# Patient Record
Sex: MALE | Race: WHITE | NOT HISPANIC OR LATINO
[De-identification: names, ages, dates, MRNs, and addresses within clinical notes are randomized per-mention and may not be internally consistent; named-entity substitution may affect disease eponyms.]

---

## 2017-01-30 ENCOUNTER — TRANSCRIPTION ENCOUNTER (OUTPATIENT)
Age: 47
End: 2017-01-30

## 2017-02-02 ENCOUNTER — TRANSCRIPTION ENCOUNTER (OUTPATIENT)
Age: 47
End: 2017-02-02

## 2017-02-08 ENCOUNTER — TRANSCRIPTION ENCOUNTER (OUTPATIENT)
Age: 47
End: 2017-02-08

## 2023-10-19 ENCOUNTER — INPATIENT (INPATIENT)
Facility: HOSPITAL | Age: 53
LOS: 1 days | Discharge: HOME | DRG: 392 | End: 2023-10-21
Attending: FAMILY MEDICINE | Admitting: HOSPITALIST
Payer: COMMERCIAL

## 2023-10-19 VITALS — HEIGHT: 76 IN | WEIGHT: 214.95 LBS

## 2023-10-19 DIAGNOSIS — K57.92 DIVERTICULITIS OF INTESTINE, PART UNSPECIFIED, WITHOUT PERFORATION OR ABSCESS WITHOUT BLEEDING: ICD-10-CM

## 2023-10-19 LAB
ALBUMIN SERPL ELPH-MCNC: 4 G/DL — SIGNIFICANT CHANGE UP (ref 3.3–5)
ALBUMIN SERPL ELPH-MCNC: 4 G/DL — SIGNIFICANT CHANGE UP (ref 3.3–5)
ALP SERPL-CCNC: 65 U/L — SIGNIFICANT CHANGE UP (ref 40–120)
ALP SERPL-CCNC: 65 U/L — SIGNIFICANT CHANGE UP (ref 40–120)
ALT FLD-CCNC: 28 U/L — SIGNIFICANT CHANGE UP (ref 12–78)
ALT FLD-CCNC: 28 U/L — SIGNIFICANT CHANGE UP (ref 12–78)
ANION GAP SERPL CALC-SCNC: 6 MMOL/L — SIGNIFICANT CHANGE UP (ref 5–17)
ANION GAP SERPL CALC-SCNC: 6 MMOL/L — SIGNIFICANT CHANGE UP (ref 5–17)
APPEARANCE UR: CLEAR — SIGNIFICANT CHANGE UP
APPEARANCE UR: CLEAR — SIGNIFICANT CHANGE UP
AST SERPL-CCNC: 18 U/L — SIGNIFICANT CHANGE UP (ref 15–37)
AST SERPL-CCNC: 18 U/L — SIGNIFICANT CHANGE UP (ref 15–37)
BACTERIA # UR AUTO: NEGATIVE /HPF — SIGNIFICANT CHANGE UP
BACTERIA # UR AUTO: NEGATIVE /HPF — SIGNIFICANT CHANGE UP
BASOPHILS # BLD AUTO: 0.08 K/UL — SIGNIFICANT CHANGE UP (ref 0–0.2)
BASOPHILS # BLD AUTO: 0.08 K/UL — SIGNIFICANT CHANGE UP (ref 0–0.2)
BASOPHILS NFR BLD AUTO: 0.6 % — SIGNIFICANT CHANGE UP (ref 0–2)
BASOPHILS NFR BLD AUTO: 0.6 % — SIGNIFICANT CHANGE UP (ref 0–2)
BILIRUB SERPL-MCNC: 0.6 MG/DL — SIGNIFICANT CHANGE UP (ref 0.2–1.2)
BILIRUB SERPL-MCNC: 0.6 MG/DL — SIGNIFICANT CHANGE UP (ref 0.2–1.2)
BILIRUB UR-MCNC: NEGATIVE — SIGNIFICANT CHANGE UP
BILIRUB UR-MCNC: NEGATIVE — SIGNIFICANT CHANGE UP
BUN SERPL-MCNC: 9 MG/DL — SIGNIFICANT CHANGE UP (ref 7–23)
BUN SERPL-MCNC: 9 MG/DL — SIGNIFICANT CHANGE UP (ref 7–23)
CALCIUM SERPL-MCNC: 9.7 MG/DL — SIGNIFICANT CHANGE UP (ref 8.5–10.1)
CALCIUM SERPL-MCNC: 9.7 MG/DL — SIGNIFICANT CHANGE UP (ref 8.5–10.1)
CHLORIDE SERPL-SCNC: 105 MMOL/L — SIGNIFICANT CHANGE UP (ref 96–108)
CHLORIDE SERPL-SCNC: 105 MMOL/L — SIGNIFICANT CHANGE UP (ref 96–108)
CO2 SERPL-SCNC: 26 MMOL/L — SIGNIFICANT CHANGE UP (ref 22–31)
CO2 SERPL-SCNC: 26 MMOL/L — SIGNIFICANT CHANGE UP (ref 22–31)
COLOR SPEC: SIGNIFICANT CHANGE UP
COLOR SPEC: SIGNIFICANT CHANGE UP
CREAT SERPL-MCNC: 0.91 MG/DL — SIGNIFICANT CHANGE UP (ref 0.5–1.3)
CREAT SERPL-MCNC: 0.91 MG/DL — SIGNIFICANT CHANGE UP (ref 0.5–1.3)
DIFF PNL FLD: ABNORMAL
DIFF PNL FLD: ABNORMAL
EGFR: 100 ML/MIN/1.73M2 — SIGNIFICANT CHANGE UP
EGFR: 100 ML/MIN/1.73M2 — SIGNIFICANT CHANGE UP
EOSINOPHIL # BLD AUTO: 0.25 K/UL — SIGNIFICANT CHANGE UP (ref 0–0.5)
EOSINOPHIL # BLD AUTO: 0.25 K/UL — SIGNIFICANT CHANGE UP (ref 0–0.5)
EOSINOPHIL NFR BLD AUTO: 1.9 % — SIGNIFICANT CHANGE UP (ref 0–6)
EOSINOPHIL NFR BLD AUTO: 1.9 % — SIGNIFICANT CHANGE UP (ref 0–6)
GLUCOSE SERPL-MCNC: 96 MG/DL — SIGNIFICANT CHANGE UP (ref 70–99)
GLUCOSE SERPL-MCNC: 96 MG/DL — SIGNIFICANT CHANGE UP (ref 70–99)
GLUCOSE UR QL: NEGATIVE MG/DL — SIGNIFICANT CHANGE UP
GLUCOSE UR QL: NEGATIVE MG/DL — SIGNIFICANT CHANGE UP
HCT VFR BLD CALC: 46 % — SIGNIFICANT CHANGE UP (ref 39–50)
HCT VFR BLD CALC: 46 % — SIGNIFICANT CHANGE UP (ref 39–50)
HGB BLD-MCNC: 15.3 G/DL — SIGNIFICANT CHANGE UP (ref 13–17)
HGB BLD-MCNC: 15.3 G/DL — SIGNIFICANT CHANGE UP (ref 13–17)
IMM GRANULOCYTES NFR BLD AUTO: 0.5 % — SIGNIFICANT CHANGE UP (ref 0–0.9)
IMM GRANULOCYTES NFR BLD AUTO: 0.5 % — SIGNIFICANT CHANGE UP (ref 0–0.9)
KETONES UR-MCNC: 40 MG/DL
KETONES UR-MCNC: 40 MG/DL
LEUKOCYTE ESTERASE UR-ACNC: ABNORMAL
LEUKOCYTE ESTERASE UR-ACNC: ABNORMAL
LIDOCAIN IGE QN: 21 U/L — SIGNIFICANT CHANGE UP (ref 13–75)
LIDOCAIN IGE QN: 21 U/L — SIGNIFICANT CHANGE UP (ref 13–75)
LYMPHOCYTES # BLD AUTO: 19.2 % — SIGNIFICANT CHANGE UP (ref 13–44)
LYMPHOCYTES # BLD AUTO: 19.2 % — SIGNIFICANT CHANGE UP (ref 13–44)
LYMPHOCYTES # BLD AUTO: 2.54 K/UL — SIGNIFICANT CHANGE UP (ref 1–3.3)
LYMPHOCYTES # BLD AUTO: 2.54 K/UL — SIGNIFICANT CHANGE UP (ref 1–3.3)
MCHC RBC-ENTMCNC: 30.1 PG — SIGNIFICANT CHANGE UP (ref 27–34)
MCHC RBC-ENTMCNC: 30.1 PG — SIGNIFICANT CHANGE UP (ref 27–34)
MCHC RBC-ENTMCNC: 33.3 GM/DL — SIGNIFICANT CHANGE UP (ref 32–36)
MCHC RBC-ENTMCNC: 33.3 GM/DL — SIGNIFICANT CHANGE UP (ref 32–36)
MCV RBC AUTO: 90.4 FL — SIGNIFICANT CHANGE UP (ref 80–100)
MCV RBC AUTO: 90.4 FL — SIGNIFICANT CHANGE UP (ref 80–100)
MONOCYTES # BLD AUTO: 1 K/UL — HIGH (ref 0–0.9)
MONOCYTES # BLD AUTO: 1 K/UL — HIGH (ref 0–0.9)
MONOCYTES NFR BLD AUTO: 7.6 % — SIGNIFICANT CHANGE UP (ref 2–14)
MONOCYTES NFR BLD AUTO: 7.6 % — SIGNIFICANT CHANGE UP (ref 2–14)
NEUTROPHILS # BLD AUTO: 9.27 K/UL — HIGH (ref 1.8–7.4)
NEUTROPHILS # BLD AUTO: 9.27 K/UL — HIGH (ref 1.8–7.4)
NEUTROPHILS NFR BLD AUTO: 70.2 % — SIGNIFICANT CHANGE UP (ref 43–77)
NEUTROPHILS NFR BLD AUTO: 70.2 % — SIGNIFICANT CHANGE UP (ref 43–77)
NITRITE UR-MCNC: NEGATIVE — SIGNIFICANT CHANGE UP
NITRITE UR-MCNC: NEGATIVE — SIGNIFICANT CHANGE UP
PH UR: 5 — SIGNIFICANT CHANGE UP (ref 5–8)
PH UR: 5 — SIGNIFICANT CHANGE UP (ref 5–8)
PLATELET # BLD AUTO: 304 K/UL — SIGNIFICANT CHANGE UP (ref 150–400)
PLATELET # BLD AUTO: 304 K/UL — SIGNIFICANT CHANGE UP (ref 150–400)
POTASSIUM SERPL-MCNC: 4.3 MMOL/L — SIGNIFICANT CHANGE UP (ref 3.5–5.3)
POTASSIUM SERPL-MCNC: 4.3 MMOL/L — SIGNIFICANT CHANGE UP (ref 3.5–5.3)
POTASSIUM SERPL-SCNC: 4.3 MMOL/L — SIGNIFICANT CHANGE UP (ref 3.5–5.3)
POTASSIUM SERPL-SCNC: 4.3 MMOL/L — SIGNIFICANT CHANGE UP (ref 3.5–5.3)
PROT SERPL-MCNC: 8.6 GM/DL — HIGH (ref 6–8.3)
PROT SERPL-MCNC: 8.6 GM/DL — HIGH (ref 6–8.3)
PROT UR-MCNC: 30 MG/DL
PROT UR-MCNC: 30 MG/DL
RBC # BLD: 5.09 M/UL — SIGNIFICANT CHANGE UP (ref 4.2–5.8)
RBC # BLD: 5.09 M/UL — SIGNIFICANT CHANGE UP (ref 4.2–5.8)
RBC # FLD: 12.7 % — SIGNIFICANT CHANGE UP (ref 10.3–14.5)
RBC # FLD: 12.7 % — SIGNIFICANT CHANGE UP (ref 10.3–14.5)
RBC CASTS # UR COMP ASSIST: 14 /HPF — HIGH (ref 0–4)
RBC CASTS # UR COMP ASSIST: 14 /HPF — HIGH (ref 0–4)
SODIUM SERPL-SCNC: 137 MMOL/L — SIGNIFICANT CHANGE UP (ref 135–145)
SODIUM SERPL-SCNC: 137 MMOL/L — SIGNIFICANT CHANGE UP (ref 135–145)
SP GR SPEC: 1.03 — SIGNIFICANT CHANGE UP (ref 1–1.03)
SP GR SPEC: 1.03 — SIGNIFICANT CHANGE UP (ref 1–1.03)
SQUAMOUS # UR AUTO: 1 /HPF — SIGNIFICANT CHANGE UP (ref 0–5)
SQUAMOUS # UR AUTO: 1 /HPF — SIGNIFICANT CHANGE UP (ref 0–5)
UROBILINOGEN FLD QL: 1 MG/DL — SIGNIFICANT CHANGE UP (ref 0.2–1)
UROBILINOGEN FLD QL: 1 MG/DL — SIGNIFICANT CHANGE UP (ref 0.2–1)
WBC # BLD: 13.2 K/UL — HIGH (ref 3.8–10.5)
WBC # BLD: 13.2 K/UL — HIGH (ref 3.8–10.5)
WBC # FLD AUTO: 13.2 K/UL — HIGH (ref 3.8–10.5)
WBC # FLD AUTO: 13.2 K/UL — HIGH (ref 3.8–10.5)
WBC UR QL: 1 /HPF — SIGNIFICANT CHANGE UP (ref 0–5)
WBC UR QL: 1 /HPF — SIGNIFICANT CHANGE UP (ref 0–5)

## 2023-10-19 PROCEDURE — 99497 ADVNCD CARE PLAN 30 MIN: CPT | Mod: 25

## 2023-10-19 PROCEDURE — 87040 BLOOD CULTURE FOR BACTERIA: CPT

## 2023-10-19 PROCEDURE — 36415 COLL VENOUS BLD VENIPUNCTURE: CPT

## 2023-10-19 PROCEDURE — 85025 COMPLETE CBC W/AUTO DIFF WBC: CPT

## 2023-10-19 PROCEDURE — 99285 EMERGENCY DEPT VISIT HI MDM: CPT

## 2023-10-19 PROCEDURE — 80048 BASIC METABOLIC PNL TOTAL CA: CPT

## 2023-10-19 PROCEDURE — 99223 1ST HOSP IP/OBS HIGH 75: CPT

## 2023-10-19 PROCEDURE — 74177 CT ABD & PELVIS W/CONTRAST: CPT | Mod: 26,MA

## 2023-10-19 PROCEDURE — 80053 COMPREHEN METABOLIC PANEL: CPT

## 2023-10-19 RX ORDER — ACETAMINOPHEN 500 MG
650 TABLET ORAL EVERY 6 HOURS
Refills: 0 | Status: DISCONTINUED | OUTPATIENT
Start: 2023-10-19 | End: 2023-10-21

## 2023-10-19 RX ORDER — ONDANSETRON 8 MG/1
4 TABLET, FILM COATED ORAL ONCE
Refills: 0 | Status: COMPLETED | OUTPATIENT
Start: 2023-10-19 | End: 2023-10-19

## 2023-10-19 RX ORDER — INFLUENZA VIRUS VACCINE 15; 15; 15; 15 UG/.5ML; UG/.5ML; UG/.5ML; UG/.5ML
0.5 SUSPENSION INTRAMUSCULAR ONCE
Refills: 0 | Status: DISCONTINUED | OUTPATIENT
Start: 2023-10-19 | End: 2023-10-21

## 2023-10-19 RX ORDER — SODIUM CHLORIDE 9 MG/ML
1000 INJECTION INTRAMUSCULAR; INTRAVENOUS; SUBCUTANEOUS ONCE
Refills: 0 | Status: COMPLETED | OUTPATIENT
Start: 2023-10-19 | End: 2023-10-19

## 2023-10-19 RX ORDER — LANOLIN ALCOHOL/MO/W.PET/CERES
5 CREAM (GRAM) TOPICAL AT BEDTIME
Refills: 0 | Status: DISCONTINUED | OUTPATIENT
Start: 2023-10-19 | End: 2023-10-21

## 2023-10-19 RX ORDER — PIPERACILLIN AND TAZOBACTAM 4; .5 G/20ML; G/20ML
3.38 INJECTION, POWDER, LYOPHILIZED, FOR SOLUTION INTRAVENOUS EVERY 8 HOURS
Refills: 0 | Status: DISCONTINUED | OUTPATIENT
Start: 2023-10-19 | End: 2023-10-21

## 2023-10-19 RX ORDER — ONDANSETRON 8 MG/1
4 TABLET, FILM COATED ORAL EVERY 4 HOURS
Refills: 0 | Status: DISCONTINUED | OUTPATIENT
Start: 2023-10-19 | End: 2023-10-21

## 2023-10-19 RX ORDER — SODIUM CHLORIDE 9 MG/ML
1000 INJECTION INTRAMUSCULAR; INTRAVENOUS; SUBCUTANEOUS
Refills: 0 | Status: DISCONTINUED | OUTPATIENT
Start: 2023-10-19 | End: 2023-10-20

## 2023-10-19 RX ORDER — PIPERACILLIN AND TAZOBACTAM 4; .5 G/20ML; G/20ML
3.38 INJECTION, POWDER, LYOPHILIZED, FOR SOLUTION INTRAVENOUS ONCE
Refills: 0 | Status: COMPLETED | OUTPATIENT
Start: 2023-10-19 | End: 2023-10-19

## 2023-10-19 RX ADMIN — PIPERACILLIN AND TAZOBACTAM 200 GRAM(S): 4; .5 INJECTION, POWDER, LYOPHILIZED, FOR SOLUTION INTRAVENOUS at 19:30

## 2023-10-19 RX ADMIN — Medication 5 MILLIGRAM(S): at 22:16

## 2023-10-19 RX ADMIN — SODIUM CHLORIDE 1000 MILLILITER(S): 9 INJECTION INTRAMUSCULAR; INTRAVENOUS; SUBCUTANEOUS at 16:38

## 2023-10-19 RX ADMIN — SODIUM CHLORIDE 100 MILLILITER(S): 9 INJECTION INTRAMUSCULAR; INTRAVENOUS; SUBCUTANEOUS at 22:16

## 2023-10-19 RX ADMIN — ONDANSETRON 4 MILLIGRAM(S): 8 TABLET, FILM COATED ORAL at 16:37

## 2023-10-19 RX ADMIN — PIPERACILLIN AND TAZOBACTAM 25 GRAM(S): 4; .5 INJECTION, POWDER, LYOPHILIZED, FOR SOLUTION INTRAVENOUS at 22:31

## 2023-10-19 NOTE — ED STATDOCS - ATTENDING APP SHARED VISIT CONTRIBUTION OF CARE
I, Joanie Trejo DO, personally saw the patient with ACP.  I have personally performed a face to face diagnostic evaluation on this patient.  I have reviewed the ACP note and agree with the history, exam, and plan of care, except as noted.   The initial assessment was performed by myself and then the patient was handed off to the ACP. The patient was followed and re-evaluated by the ACP. All labs, imaging and procedures were evaluated and performed by the ACP and I was available for consultation if any questions in the patients care came up.

## 2023-10-19 NOTE — ED STATDOCS - CLINICAL SUMMARY MEDICAL DECISION MAKING FREE TEXT BOX
Will r/o any worsening diverticulitis vs colitis vs UTI. Plan to do labs, imaging, pain control, and reassess.

## 2023-10-19 NOTE — ED ADULT TRIAGE NOTE - CHIEF COMPLAINT QUOTE
patient presenting to ER with c/o abdominal pain, currently on cipro/flagyl for diverticulitis flare. now reports abdominal pain is even worse than before taking the medicine. pt educated on possible s/e of abx. +N/V/D.

## 2023-10-19 NOTE — ED STATDOCS - OBJECTIVE STATEMENT
54 yo male w/ no pertinent PMHx presents to the ED c/o abdominal pain, for diverticulitis flare. Pt with history of diverticulitis. Pt had a ct scan done last Thursday. States the pain is getting worse. Pt has been taking Ciproflaxin/Flagyl for almost 1 week. +nausea +vomiting, +decrease in PO intake, +fever. Pt c/o LLQ abdominal pain. +diarrhea. States the abdominal pain is intermittent. 53 year old male presenting with worsening abdominal pain.  Pt. currently on Cipro/Flagyl for diverticulitis x 7 days.  CT performed one week ago.  Pain worsening asp er patient. reported he has N/V and subjective fever.  Diarrhea also reported. Denies chest pain, dizziness, SOB, diarrhea, weakness or numbness of ext.

## 2023-10-19 NOTE — ED ADULT NURSE NOTE - NSFALLUNIVINTERV_ED_ALL_ED
Bed/Stretcher in lowest position, wheels locked, appropriate side rails in place/Call bell, personal items and telephone in reach/Instruct patient to call for assistance before getting out of bed/chair/stretcher/Non-slip footwear applied when patient is off stretcher/[place] to call system/Physically safe environment - no spills, clutter or unnecessary equipment/Purposeful proactive rounding/Room/bathroom lighting operational, light cord in reach

## 2023-10-19 NOTE — ED STATDOCS - PROGRESS NOTE DETAILS
Pt. is a 53 year old male presenting with abdominal pain.  Pt. currently on Cipro/Flagyl for diveritculitis x 7 days.  CT performed one week ago.  Pain worsening asp er patient.  N/V and subjective fever.  DIarrhea also repoprted.  Maribeth Nair PA-C Pt. is a 53 year old male presenting with abdominal pain.  Pt. currently on Cipro/Flagyl for diverticulitis x 7 days.  CT performed one week ago.  Pain worsening asp er patient.  N/V and subjective fever.  Diarrhea also reported.  Maribeth Nair PA-C I attempted to have CT results from Dr. Felix's office faxed.  Called x 2 wihtoutr receiving.  CT demonstrating uncomplicated sigmoid diverticulitis without fluid collection.  NEg. perforation.

## 2023-10-19 NOTE — H&P ADULT - TIME BILLING
A minimum of 75 min was spent completing this admission not including time spent discussing advanced care planning or discussing goals of care with a minimum of 36 min spent face to face with patient while in ED unit on admission, counseling patient on current acute on chronic conditions and discussing plan and coordination of care including diagnostic imaging such as ......CT scan, diagnostic testing such and performance of giuiac at bedside & straight cath for sterile urine, ......diagnostic laboratory tests which were ordered and reasoning behind each test, discussion of consultants ordered to evaluate patient in am and reasoning behind each specific need such as .......GI for intractable diarrhea, ID for concern for recurrent C. diff, and nutrition due to acute on chronic malnutrition and cachexia, .....and also predicted possible discharge planning discussed ie [facility] vs home with home services. A minimum of 75 min was spent completing this admission not including time spent discussing advanced care planning or discussing goals of care with a minimum of 36 min spent face to face with patient while in unit on admission, counseling patient on current acute on chronic conditions and discussing plan and coordination of care including diagnostic imaging such as CT scan, discussion of consultants ordered to evaluate patient in am and reasoning behind each specific need such as GI for intractable diarrhea in setting of diverticulitis, ID for concern for recurrent diverticulitis and failure of outpt abx.

## 2023-10-19 NOTE — H&P ADULT - HISTORY OF PRESENT ILLNESS
Pt is a 52 yo male with a pmh/o diverticulitis, uncomplicated and treated as outpatient about 20 yrs ago, benign colon polyps diagnosed 3 yrs ago, who presents to ED due to intractable generalized, non radiating, cramplike abdominal pain which fluctuated in intensity and is aggrevated by oral intake and associated with diarrhea. Pt states he was diagnosed with diverticulitis as outpatient and placed on cipro/flagyl. States last week he was having 7-8 BM per day, non bloody but watery diarrhea and since starting abx is now down to 2-3 BM a day however states that pain has not gotten better and has actually worsened over time prompting his ED visit.

## 2023-10-19 NOTE — H&P ADULT - NSICDXPASTMEDICALHX_GEN_ALL_CORE_FT
PAST MEDICAL HISTORY:  Colon polyps     H/O hammer toe correction     History of diverticulitis     History of patellar fracture

## 2023-10-19 NOTE — ED STATDOCS - PHYSICAL EXAMINATION
General: Patient in no acute distress, AAOX3.   HENMT: NC/AT, no nasal congestion, MMM  Neck: supple  CVS: regular rate and rhythm, no murmur  Resp: Good air entry bilaterally, No wheeze/rhonchi.  Abd: Soft, non distended, +bowel sounds, No guarding, rebound tenderness, LLQ ttp  Ext: FROM in all ext, 2+ pulses throughout, cap refill<2 sec.  BACK: no midline tenderness, no stepoffs, no CVA ttp  NEURO: no focal deficit, gross motor and sensory intact throughout, gait stable.

## 2023-10-19 NOTE — ED ADULT NURSE NOTE - OBJECTIVE STATEMENT
Pt AOx4 from home c/o lower abd pain beginning 7 days ago. As per pt PMHx of diverticulitis and was given PO abx outpatient and has been becoming increasingly worse s/s. Pt denies fevers, chills, n/v/d.

## 2023-10-19 NOTE — ED STATDOCS - NS_ ATTENDINGSCRIBEDETAILS _ED_A_ED_FT
I, Joanie Trejo DO,  performed the initial face to face bedside interview with this patient regarding history of present illness, review of symptoms and relevant past medical, social and family history.  I completed an independent physical examination.  I was the initial provider who evaluated this patient.   I personally saw the patient and performed a substantive portion of the visit including all aspects of the medical decision making.  The history, relevant review of systems, past medical and surgical history, medical decision making, and physical examination was documented by the scribe in my presence and I attest to the accuracy of the documentation.

## 2023-10-19 NOTE — PATIENT PROFILE ADULT - FALL HARM RISK - RISK INTERVENTIONS

## 2023-10-19 NOTE — ED STATDOCS - NS ED ROS FT
General: +fever, +nausea, +vomiting  HEENT: No loc, no ha, no dizziness  Respiratory: no trouble breathing  Cardiovascular: No chest pain  GI: +abdominal pain, +diarrhea  : No urinary complaints  Endocrine: no generalized weakness  Neurological: No weakness, numbness  MSK: no recent trauma General: +fever, +nausea, +vomiting  HEENT: No loc, no ha, no dizziness  Respiratory: no trouble breathing  Cardiovascular: No chest pain  GI: +abdominal pain,  : No urinary complaints  Endocrine: no generalized weakness  Neurological: No weakness, numbness  MSK: no recent trauma

## 2023-10-20 DIAGNOSIS — Z98.890 OTHER SPECIFIED POSTPROCEDURAL STATES: Chronic | ICD-10-CM

## 2023-10-20 DIAGNOSIS — Z87.81 PERSONAL HISTORY OF (HEALED) TRAUMATIC FRACTURE: Chronic | ICD-10-CM

## 2023-10-20 LAB
ALBUMIN SERPL ELPH-MCNC: 2.9 G/DL — LOW (ref 3.3–5)
ALBUMIN SERPL ELPH-MCNC: 2.9 G/DL — LOW (ref 3.3–5)
ALP SERPL-CCNC: 47 U/L — SIGNIFICANT CHANGE UP (ref 40–120)
ALP SERPL-CCNC: 47 U/L — SIGNIFICANT CHANGE UP (ref 40–120)
ALT FLD-CCNC: 21 U/L — SIGNIFICANT CHANGE UP (ref 12–78)
ALT FLD-CCNC: 21 U/L — SIGNIFICANT CHANGE UP (ref 12–78)
ANION GAP SERPL CALC-SCNC: 6 MMOL/L — SIGNIFICANT CHANGE UP (ref 5–17)
ANION GAP SERPL CALC-SCNC: 6 MMOL/L — SIGNIFICANT CHANGE UP (ref 5–17)
AST SERPL-CCNC: 16 U/L — SIGNIFICANT CHANGE UP (ref 15–37)
AST SERPL-CCNC: 16 U/L — SIGNIFICANT CHANGE UP (ref 15–37)
BASOPHILS # BLD AUTO: 0.07 K/UL — SIGNIFICANT CHANGE UP (ref 0–0.2)
BASOPHILS # BLD AUTO: 0.07 K/UL — SIGNIFICANT CHANGE UP (ref 0–0.2)
BASOPHILS NFR BLD AUTO: 0.9 % — SIGNIFICANT CHANGE UP (ref 0–2)
BASOPHILS NFR BLD AUTO: 0.9 % — SIGNIFICANT CHANGE UP (ref 0–2)
BILIRUB SERPL-MCNC: 0.4 MG/DL — SIGNIFICANT CHANGE UP (ref 0.2–1.2)
BILIRUB SERPL-MCNC: 0.4 MG/DL — SIGNIFICANT CHANGE UP (ref 0.2–1.2)
BUN SERPL-MCNC: 9 MG/DL — SIGNIFICANT CHANGE UP (ref 7–23)
BUN SERPL-MCNC: 9 MG/DL — SIGNIFICANT CHANGE UP (ref 7–23)
CALCIUM SERPL-MCNC: 8.5 MG/DL — SIGNIFICANT CHANGE UP (ref 8.5–10.1)
CALCIUM SERPL-MCNC: 8.5 MG/DL — SIGNIFICANT CHANGE UP (ref 8.5–10.1)
CHLORIDE SERPL-SCNC: 110 MMOL/L — HIGH (ref 96–108)
CHLORIDE SERPL-SCNC: 110 MMOL/L — HIGH (ref 96–108)
CO2 SERPL-SCNC: 24 MMOL/L — SIGNIFICANT CHANGE UP (ref 22–31)
CO2 SERPL-SCNC: 24 MMOL/L — SIGNIFICANT CHANGE UP (ref 22–31)
CREAT SERPL-MCNC: 0.8 MG/DL — SIGNIFICANT CHANGE UP (ref 0.5–1.3)
CREAT SERPL-MCNC: 0.8 MG/DL — SIGNIFICANT CHANGE UP (ref 0.5–1.3)
CULTURE RESULTS: NO GROWTH — SIGNIFICANT CHANGE UP
CULTURE RESULTS: NO GROWTH — SIGNIFICANT CHANGE UP
EGFR: 105 ML/MIN/1.73M2 — SIGNIFICANT CHANGE UP
EGFR: 105 ML/MIN/1.73M2 — SIGNIFICANT CHANGE UP
EOSINOPHIL # BLD AUTO: 0.39 K/UL — SIGNIFICANT CHANGE UP (ref 0–0.5)
EOSINOPHIL # BLD AUTO: 0.39 K/UL — SIGNIFICANT CHANGE UP (ref 0–0.5)
EOSINOPHIL NFR BLD AUTO: 4.9 % — SIGNIFICANT CHANGE UP (ref 0–6)
EOSINOPHIL NFR BLD AUTO: 4.9 % — SIGNIFICANT CHANGE UP (ref 0–6)
GLUCOSE SERPL-MCNC: 108 MG/DL — HIGH (ref 70–99)
GLUCOSE SERPL-MCNC: 108 MG/DL — HIGH (ref 70–99)
HCT VFR BLD CALC: 38.3 % — LOW (ref 39–50)
HCT VFR BLD CALC: 38.3 % — LOW (ref 39–50)
HGB BLD-MCNC: 12.8 G/DL — LOW (ref 13–17)
HGB BLD-MCNC: 12.8 G/DL — LOW (ref 13–17)
IMM GRANULOCYTES NFR BLD AUTO: 0.3 % — SIGNIFICANT CHANGE UP (ref 0–0.9)
IMM GRANULOCYTES NFR BLD AUTO: 0.3 % — SIGNIFICANT CHANGE UP (ref 0–0.9)
LYMPHOCYTES # BLD AUTO: 2.23 K/UL — SIGNIFICANT CHANGE UP (ref 1–3.3)
LYMPHOCYTES # BLD AUTO: 2.23 K/UL — SIGNIFICANT CHANGE UP (ref 1–3.3)
LYMPHOCYTES # BLD AUTO: 28.3 % — SIGNIFICANT CHANGE UP (ref 13–44)
LYMPHOCYTES # BLD AUTO: 28.3 % — SIGNIFICANT CHANGE UP (ref 13–44)
MCHC RBC-ENTMCNC: 30 PG — SIGNIFICANT CHANGE UP (ref 27–34)
MCHC RBC-ENTMCNC: 30 PG — SIGNIFICANT CHANGE UP (ref 27–34)
MCHC RBC-ENTMCNC: 33.4 GM/DL — SIGNIFICANT CHANGE UP (ref 32–36)
MCHC RBC-ENTMCNC: 33.4 GM/DL — SIGNIFICANT CHANGE UP (ref 32–36)
MCV RBC AUTO: 89.9 FL — SIGNIFICANT CHANGE UP (ref 80–100)
MCV RBC AUTO: 89.9 FL — SIGNIFICANT CHANGE UP (ref 80–100)
MONOCYTES # BLD AUTO: 0.87 K/UL — SIGNIFICANT CHANGE UP (ref 0–0.9)
MONOCYTES # BLD AUTO: 0.87 K/UL — SIGNIFICANT CHANGE UP (ref 0–0.9)
MONOCYTES NFR BLD AUTO: 11 % — SIGNIFICANT CHANGE UP (ref 2–14)
MONOCYTES NFR BLD AUTO: 11 % — SIGNIFICANT CHANGE UP (ref 2–14)
NEUTROPHILS # BLD AUTO: 4.3 K/UL — SIGNIFICANT CHANGE UP (ref 1.8–7.4)
NEUTROPHILS # BLD AUTO: 4.3 K/UL — SIGNIFICANT CHANGE UP (ref 1.8–7.4)
NEUTROPHILS NFR BLD AUTO: 54.6 % — SIGNIFICANT CHANGE UP (ref 43–77)
NEUTROPHILS NFR BLD AUTO: 54.6 % — SIGNIFICANT CHANGE UP (ref 43–77)
PLATELET # BLD AUTO: 239 K/UL — SIGNIFICANT CHANGE UP (ref 150–400)
PLATELET # BLD AUTO: 239 K/UL — SIGNIFICANT CHANGE UP (ref 150–400)
POTASSIUM SERPL-MCNC: 4.1 MMOL/L — SIGNIFICANT CHANGE UP (ref 3.5–5.3)
POTASSIUM SERPL-MCNC: 4.1 MMOL/L — SIGNIFICANT CHANGE UP (ref 3.5–5.3)
POTASSIUM SERPL-SCNC: 4.1 MMOL/L — SIGNIFICANT CHANGE UP (ref 3.5–5.3)
POTASSIUM SERPL-SCNC: 4.1 MMOL/L — SIGNIFICANT CHANGE UP (ref 3.5–5.3)
PROT SERPL-MCNC: 6.6 GM/DL — SIGNIFICANT CHANGE UP (ref 6–8.3)
PROT SERPL-MCNC: 6.6 GM/DL — SIGNIFICANT CHANGE UP (ref 6–8.3)
RBC # BLD: 4.26 M/UL — SIGNIFICANT CHANGE UP (ref 4.2–5.8)
RBC # BLD: 4.26 M/UL — SIGNIFICANT CHANGE UP (ref 4.2–5.8)
RBC # FLD: 12.7 % — SIGNIFICANT CHANGE UP (ref 10.3–14.5)
RBC # FLD: 12.7 % — SIGNIFICANT CHANGE UP (ref 10.3–14.5)
SODIUM SERPL-SCNC: 140 MMOL/L — SIGNIFICANT CHANGE UP (ref 135–145)
SODIUM SERPL-SCNC: 140 MMOL/L — SIGNIFICANT CHANGE UP (ref 135–145)
SPECIMEN SOURCE: SIGNIFICANT CHANGE UP
SPECIMEN SOURCE: SIGNIFICANT CHANGE UP
WBC # BLD: 7.88 K/UL — SIGNIFICANT CHANGE UP (ref 3.8–10.5)
WBC # BLD: 7.88 K/UL — SIGNIFICANT CHANGE UP (ref 3.8–10.5)
WBC # FLD AUTO: 7.88 K/UL — SIGNIFICANT CHANGE UP (ref 3.8–10.5)
WBC # FLD AUTO: 7.88 K/UL — SIGNIFICANT CHANGE UP (ref 3.8–10.5)

## 2023-10-20 PROCEDURE — 99232 SBSQ HOSP IP/OBS MODERATE 35: CPT

## 2023-10-20 RX ADMIN — SODIUM CHLORIDE 100 MILLILITER(S): 9 INJECTION INTRAMUSCULAR; INTRAVENOUS; SUBCUTANEOUS at 10:28

## 2023-10-20 RX ADMIN — PIPERACILLIN AND TAZOBACTAM 25 GRAM(S): 4; .5 INJECTION, POWDER, LYOPHILIZED, FOR SOLUTION INTRAVENOUS at 14:21

## 2023-10-20 RX ADMIN — PIPERACILLIN AND TAZOBACTAM 25 GRAM(S): 4; .5 INJECTION, POWDER, LYOPHILIZED, FOR SOLUTION INTRAVENOUS at 22:22

## 2023-10-20 RX ADMIN — PIPERACILLIN AND TAZOBACTAM 25 GRAM(S): 4; .5 INJECTION, POWDER, LYOPHILIZED, FOR SOLUTION INTRAVENOUS at 05:21

## 2023-10-20 RX ADMIN — Medication 5 MILLIGRAM(S): at 22:21

## 2023-10-20 NOTE — DIETITIAN INITIAL EVALUATION ADULT - PERTINENT LABORATORY DATA
10-20    140  |  110<H>  |  9   ----------------------------<  108<H>  4.1   |  24  |  0.80    Ca    8.5      20 Oct 2023 06:42    TPro  6.6  /  Alb  2.9<L>  /  TBili  0.4  /  DBili  x   /  AST  16  /  ALT  21  /  AlkPhos  47  10-20

## 2023-10-20 NOTE — DIETITIAN INITIAL EVALUATION ADULT - ORAL INTAKE PTA/DIET HISTORY
Reports a poor appetite/ intake x 1 week pta 2/2  Reports a poor appetite/ intake x 1 week pta 2/2 diverticulitis causing N/D. Per diet recall, pt consuming <50% of ENN x 1 week. Pt states that he normally has a very good appetite/ intake at home and does not follow any diet restrictions. Does not consume any ONS. Pt's wife does the cooking/ shopping.

## 2023-10-20 NOTE — CONSULT NOTE ADULT - SUBJECTIVE AND OBJECTIVE BOX
HPI:  Pt is a 52 yo male with a pmh/o diverticulitis, uncomplicated and treated as outpatient about 20 yrs ago, benign colon polyps diagnosed 3 yrs ago, who presents to ED due to intractable generalized, non radiating, cramplike abdominal pain which fluctuated in intensity and is aggrevated by oral intake and associated with diarrhea. Pt states he was diagnosed with diverticulitis as outpatient and placed on cipro/flagyl. States last week he was having 7-8 BM per day, non bloody but watery diarrhea and since starting abx is now down to 2-3 BM a day however states that pain has not gotten better and has actually worsened over time prompting his ED visit.  (19 Oct 2023 21:56)  -----------------------  Feels much better now with zosyn, only minimal pain now    PAST MEDICAL & SURGICAL HISTORY:  History of diverticulitis      Colon polyps      History of patellar fracture      H/O hammer toe correction      S/P hammer toe correction      History of patellar fracture          Home Medications:      MEDICATIONS  (STANDING):  influenza   Vaccine 0.5 milliLiter(s) IntraMuscular once  melatonin 5 milliGRAM(s) Oral at bedtime  piperacillin/tazobactam IVPB.. 3.375 Gram(s) IV Intermittent every 8 hours    MEDICATIONS  (PRN):  acetaminophen     Tablet .. 650 milliGRAM(s) Oral every 6 hours PRN Temp greater or equal to 38C (100.4F), Mild Pain (1 - 3), Moderate Pain (4 - 6)  acetaminophen     Tablet .. 650 milliGRAM(s) Oral every 6 hours PRN Mild Pain (1 - 3)  ondansetron Injectable 4 milliGRAM(s) IV Push every 4 hours PRN Nausea and/or Vomiting      Allergies    No Known Allergies    Intolerances        SOCIAL HISTORY:    FAMILY HISTORY:  FH: type 2 diabetes        ROS  As above  Otherwise unremarkable    Vital Signs Last 24 Hrs  T(C): 36.3 (20 Oct 2023 15:08), Max: 37.1 (19 Oct 2023 19:42)  T(F): 97.3 (20 Oct 2023 15:08), Max: 98.8 (19 Oct 2023 19:42)  HR: 53 (20 Oct 2023 15:08) (53 - 61)  BP: 120/68 (20 Oct 2023 15:08) (120/68 - 144/85)  BP(mean): 100 (19 Oct 2023 20:17) (100 - 100)  RR: 18 (20 Oct 2023 15:08) (17 - 18)  SpO2: 99% (20 Oct 2023 15:08) (97% - 100%)    Parameters below as of 20 Oct 2023 15:08  Patient On (Oxygen Delivery Method): room air        Constitutional: NAD, well-developed  Respiratory: CTAB  Cardiovascular: S1 and S2, RRR  Gastrointestinal: BS+, soft, NT/ND  Extremities: No peripheral edema  Psychiatric: Normal mood, normal affect  Skin: No rashes    LABS:                        12.8   7.88  )-----------( 239      ( 20 Oct 2023 06:42 )             38.3     10-20    140  |  110<H>  |  9   ----------------------------<  108<H>  4.1   |  24  |  0.80    Ca    8.5      20 Oct 2023 06:42    TPro  6.6  /  Alb  2.9<L>  /  TBili  0.4  /  DBili  x   /  AST  16  /  ALT  21  /  AlkPhos  47  10-20      LIVER FUNCTIONS - ( 20 Oct 2023 06:42 )  Alb: 2.9 g/dL / Pro: 6.6 gm/dL / ALK PHOS: 47 U/L / ALT: 21 U/L / AST: 16 U/L / GGT: x             RADIOLOGY & ADDITIONAL STUDIES:

## 2023-10-20 NOTE — DIETITIAN INITIAL EVALUATION ADULT - ADD RECOMMEND
1) Recommend to change diet to Low Fiber  2) Add 3 packets Banatrol 2/2 persistent diarrhea (provides 40 kcal, 0 g protein/ packet)  3) Obtain vitamin D 25OH level to assess nutriture  4) Please obtain weekly weights  5) MVI w/ minerals daily to ensure 100% RDA met  6) Consider adding thiamine 100 mg daily 2/2 poor PO intake  7) Encourage protein-rich foods, maximize food preferences  8) Monitor bowel movements, if no BM for >3 days, consider implementing bowel regimen.  9) Add 3 packets Banatrol 2/2 persistent diarrhea (provides 40 kcal, 0 g protein/ packet)   10) Confirm goals of care regarding nutrition support  RD will continue to monitor PO intake, labs, hydration, and wt prn.

## 2023-10-20 NOTE — DIETITIAN INITIAL EVALUATION ADULT - WEIGHT FOR BMI (KG)
Suspect 2/2 severe AS as below.   tele monitor - stable  cardiac enzymes neg for ACS  Pt was orthostatic, holding diuretics, possible dehydration  10/30 not orthostatic  DASH 1800 ADA diet   -PT eval: home with home PT when stable 98

## 2023-10-20 NOTE — CONSULT NOTE ADULT - SUBJECTIVE AND OBJECTIVE BOX
Patient is a 53y old  Male who presents with a chief complaint of Diverticulitis with failure of outpatient therapy (19 Oct 2023 21:56)    HPI:  52 yo male with a pmh/o diverticulitis, uncomplicated and treated as outpatient about 20 yrs ago, benign colon polyps diagnosed 3 yrs ago, who presents to ED due to intractable generalized, non radiating, cramplike abdominal pain which fluctuated in intensity and is aggrevated by oral intake and associated with diarrhea. Pt states he was diagnosed with diverticulitis as outpatient and placed on cipro/flagyl. States last week he was having 7-8 BM per day, non bloody but watery diarrhea and since starting abx is now down to 2-3 BM a day however states that pain has not gotten better and has actually worsened over time prompting his ED visit. Started on IV zosyn.       PMH: as above  PSH: as above  Meds: per reconciliation sheet, noted below  MEDICATIONS  (STANDING):  influenza   Vaccine 0.5 milliLiter(s) IntraMuscular once  melatonin 5 milliGRAM(s) Oral at bedtime  piperacillin/tazobactam IVPB.. 3.375 Gram(s) IV Intermittent every 8 hours  sodium chloride 0.9%. 1000 milliLiter(s) (100 mL/Hr) IV Continuous <Continuous>    Allergies    No Known Allergies    Intolerances      Social: no smoking, no alcohol, no illegal drugs; no recent travel, no exposure to TB  FAMILY HISTORY:  FH: type 2 diabetes       no history of premature cardiovascular disease in first degree relatives    ROS: the patient denies fever, no chills, no HA, no dizziness, no sore throat, no blurry vision, no CP, no palpitations, no SOB, no cough, + abdominal pain, + diarrhea, no N/V, no dysuria, no leg pain, no claudication, no rash, no joint aches, no rectal pain or bleeding, no night sweats'    All other systems reviewed and are negative    Vital Signs Last 24 Hrs  T(C): 36.8 (20 Oct 2023 08:09), Max: 37.1 (19 Oct 2023 19:42)  T(F): 98.2 (20 Oct 2023 08:09), Max: 98.8 (19 Oct 2023 19:42)  HR: 53 (20 Oct 2023 08:09) (53 - 68)  BP: 122/74 (20 Oct 2023 08:09) (122/74 - 144/85)  BP(mean): 100 (19 Oct 2023 20:17) (94 - 100)  RR: 18 (20 Oct 2023 08:09) (17 - 18)  SpO2: 97% (20 Oct 2023 08:09) (97% - 100%)    Parameters below as of 20 Oct 2023 08:09  Patient On (Oxygen Delivery Method): room air      Daily Height in cm: 193.04 (19 Oct 2023 12:57)    Daily     PE:  Constitutional: NAD   HEENT: NC/AT, EOMI, PERRLA, conjunctivae clear; ears and nose atraumatic; pharynx benign  Neck: supple; thyroid not palpable  Back: no tenderness  Respiratory: respiratory effort normal; clear to auscultation  Cardiovascular: S1S2 regular, no murmurs  Abdomen: soft, tender, not distended, positive BS; liver and spleen WNL  Genitourinary: no suprapubic tenderness  Lymphatic: no LN palpable  Musculoskeletal: no muscle tenderness, no joint swelling or tenderness  Extremities: no pedal edema  Neurological/ Psychiatric: AxOx3, Judgement and insight normal;  moving all extremities  Skin: no rashes; no palpable lesions    Labs: all available labs reviewed                        12.8   7.88  )-----------( 239      ( 20 Oct 2023 06:42 )             38.3     10-20    140  |  110<H>  |  9   ----------------------------<  108<H>  4.1   |  24  |  0.80    Ca    8.5      20 Oct 2023 06:42    TPro  6.6  /  Alb  2.9<L>  /  TBili  0.4  /  DBili  x   /  AST  16  /  ALT  21  /  AlkPhos  47  10-20     LIVER FUNCTIONS - ( 20 Oct 2023 06:42 )  Alb: 2.9 g/dL / Pro: 6.6 gm/dL / ALK PHOS: 47 U/L / ALT: 21 U/L / AST: 16 U/L / GGT: x           Urinalysis Basic - ( 20 Oct 2023 06:42 )    Color: x / Appearance: x / SG: x / pH: x  Gluc: 108 mg/dL / Ketone: x  / Bili: x / Urobili: x   Blood: x / Protein: x / Nitrite: x   Leuk Esterase: x / RBC: x / WBC x   Sq Epi: x / Non Sq Epi: x / Bacteria: x          Radiology: all available radiological tests reviewed    ACC: [id number] EXAM:  CT ABDOMEN AND PELVIS IC   ORDERED BY: JOVI LAM     PROCEDURE DATE:  10/19/2023          INTERPRETATION:  CLINICAL INFORMATION: Abdominal pain.    COMPARISON: None.    CONTRAST/COMPLICATIONS:  IV Contrast: Omnipaque 350  90 cc administered   10 cc discarded  Oral Contrast: NONE  Complications: None reported at time of study completion    PROCEDURE:  CT of the Abdomen and Pelvis was performed.  Sagittal and coronal reformats were performed.    FINDINGS:  LOWER CHEST: Within normal limits.    LIVER: Within normal limits.  BILE DUCTS: Normal caliber.  GALLBLADDER: Within normal limits.  SPLEEN: Within normal limits.  PANCREAS: Within normal limits.  ADRENALS: Within normal limits.  KIDNEYS/URETERS: No renal stones or hydronephrosis.    BLADDER: Minimally distended.  REPRODUCTIVE ORGANS: Prostate is enlarged.    BOWEL: No bowel obstruction. Appendix is normal. Colonic diverticulosis.   Colonic wall thickening and moderate pericolonic fat stranding   surrounding a sigmoid diverticulum. No drainable fluid collection or free   air.  PERITONEUM: No ascites.  VESSELS: Atherosclerotic changes.  RETROPERITONEUM/LYMPH NODES: No lymphadenopathy.  ABDOMINAL WALL: Within normal limits.  BONES: Degenerative changes.    IMPRESSION:  Acute uncomplicated sigmoid diverticulitis. No drainable fluid collection   or free air to suggest macroperforation.      < end of copied text >    Advanced directives addressed: full resuscitation

## 2023-10-20 NOTE — CONSULT NOTE ADULT - ASSESSMENT
52 yo male with a pmh/o diverticulitis, uncomplicated and treated as outpatient about 20 yrs ago, benign colon polyps diagnosed 3 yrs ago, who presents to ED due to intractable generalized, non radiating, cramplike abdominal pain which fluctuated in intensity and is aggrevated by oral intake and associated with diarrhea. Pt states he was diagnosed with diverticulitis as outpatient and placed on cipro/flagyl. States last week he was having 7-8 BM per day, non bloody but watery diarrhea and since starting abx is now down to 2-3 BM a day however states that pain has not gotten better and has actually worsened over time prompting his ED visit. Started on IV zosyn.     1. Acute sigmoid diverticulitis  - imaging reviewed  - agree with IV zosyn [id number]  - continue with abx coverage  - gi eval, bowel rest  - fu cbc  - tolerating abx well so far; no side effects noted  - reason for abx use and side effects reviewed with patient  - supportive care  - on dc po augmentin x 2 weeks    2. other issues - care per medicine 
Imp:  resolving diverticulitis    Rec:  Given initial failure of outpatient abx, would d/c on augmentin 875 TID to complete 10 days  Low fiber diet, transition to high fiber outpatient  to f/u and to consider repeat colonsocopy in 6-8 weeks

## 2023-10-20 NOTE — PROGRESS NOTE ADULT - ASSESSMENT
52 yo male admitted for acute uncomplicated diverticulitis, failed outpatient abx    #Intractable abdominal pain  #Acute uncomplicated diverticulitis with failure of outpatient treatment   #Nausea  Zofran prn n/v  Tylenol prn pain- pt states since receiving zosyn in ED his pain has subsided  Diarrhea improving, <4 BM over last 24 hrs  IVF for gentle hydration in setting of poor po intake due to nausea/abd pain  Imaging showing uncomplicated diverticulitis  Was on PO cipro/flagyl at home, prescribed on 10/12  ID consult appreciated, plan for Augmentin on d/c  GI consulted - d/w Dr. Kendrick, after my discussion with patient, I spoke to Dr. Kendrick again, will see patient   SCD for DVT ppx   Regular diet- pt tolerated sandwich in ED  Continues with diarrhea, one reported to me today - non-bloody     #Microscopic hematuria  #Ketonuria  f/u with PMD  no  sx  no gross hematuria  c/w gentle hydration  recommend repeat UA in 4-6 weeks as outpatient  no acute interventions at this time  pt reported no prior hx hematuria   exam of abdomen benign   renal function on BMP normal   CT scan with no renal stones or hydronephrosis, minimally distended, prostate is enlarged  Recommend urology follow up as outpatient     #VTE ppx  low risk  encourage ambulation        54 yo male admitted for acute uncomplicated diverticulitis, failed outpatient abx    #Intractable abdominal pain  #Acute uncomplicated diverticulitis with failure of outpatient treatment   #Nausea  #Leukocytosis - normalized now   Zofran prn n/v  Tylenol prn pain- pt states since receiving zosyn in ED his pain has subsided  Diarrhea improving, <4 BM over last 24 hrs  IVF for gentle hydration in setting of poor po intake due to nausea/abd pain  Imaging showing uncomplicated diverticulitis  Was on PO cipro/flagyl at home, prescribed on 10/12  ID consult appreciated, plan for Augmentin on d/c  GI consulted - d/w Dr. Kendrick, after my discussion with patient, I spoke to Dr. Kendrick again, will see patient   SCD for DVT ppx   Regular diet- pt tolerated sandwich in ED  Continues with diarrhea, one reported to me today - non-bloody   Blood cx pending     #Microscopic hematuria  #Ketonuria  f/u with PMD  no  sx  no gross hematuria  c/w gentle hydration  recommend repeat UA in 4-6 weeks as outpatient  no acute interventions at this time  pt reported no prior hx hematuria   exam of abdomen benign   renal function on BMP normal   urine cx NGTD   CT scan with no renal stones or hydronephrosis, minimally distended, prostate is enlarged  Recommend urology follow up as outpatient     #VTE ppx  low risk  encourage ambulation

## 2023-10-20 NOTE — DIETITIAN INITIAL EVALUATION ADULT - OTHER INFO
No 54 y/o M with a PMHx of diverticulitis, uncomplicated and treated as outpatient about 20 yrs ago, benign colon polyps diagnosed 3 yrs ago, who presented to ED due to intractable generalized, non radiating, cramplike abdominal pain which fluctuated in intensity and is aggravated by oral intake and associated with diarrhea. Pt states he was diagnosed with diverticulitis as outpatient and placed on cipro/flagyl. States last week he was having 7-8 BM per day, non bloody but watery diarrhea and since starting abx is now down to 2-3 BM a day however states that pain has not gotten better and has actually worsened over time prompting his ED visit. Admitted for diverticulitis.    Visited pt this morning, pt reports that he ate a little bit of [place] toast this morning; ~50% of tray. States that he still has a poor appetite, however it is improving. Reports that his UBW is 216# which was a stable wt, however thinks that he may have lost a little bit of weight because his face appears thinner. RD obtained bedscale wt on 10/20 - 216#. No weight hx to review, however wt appears stable per pt report. NFPE reveals no muscle/ fat wasting, pt does not meet criteria for PCM at this time. Pt at HIGH RISK for becoming malnourished 2/2 poor po intake x 1 week. Recommend to change diet to Low Fiber 2/2 diverticulitis. Pt declines need for ONS at this time. Pt receptive to adding 3 packets Banatrol 2/2 persistent diarrhea (provides 40 kcal, 0 g protein/ packet). Check serum zinc level for suspected deficiency. Consider adding 220 mg of zinc sulfate daily 2/2 persistent diarrhea. Please see additional recommendations below.

## 2023-10-20 NOTE — DIETITIAN INITIAL EVALUATION ADULT - NSFNSGIIOFT_GEN_A_CORE
I&O's Detail    19 Oct 2023 07:01  -  20 Oct 2023 07:00  --------------------------------------------------------  IN:    sodium chloride 0.9%: 702 mL  Total IN: 702 mL    OUT:  Total OUT: 0 mL    Total NET: 702 mL

## 2023-10-20 NOTE — DIETITIAN INITIAL EVALUATION ADULT - PERTINENT MEDS FT
MEDICATIONS  (STANDING):  influenza   Vaccine 0.5 milliLiter(s) IntraMuscular once  melatonin 5 milliGRAM(s) Oral at bedtime  piperacillin/tazobactam IVPB.. 3.375 Gram(s) IV Intermittent every 8 hours  sodium chloride 0.9%. 1000 milliLiter(s) (100 mL/Hr) IV Continuous <Continuous>    MEDICATIONS  (PRN):  acetaminophen     Tablet .. 650 milliGRAM(s) Oral every 6 hours PRN Temp greater or equal to 38C (100.4F), Mild Pain (1 - 3), Moderate Pain (4 - 6)  acetaminophen     Tablet .. 650 milliGRAM(s) Oral every 6 hours PRN Mild Pain (1 - 3)  ondansetron Injectable 4 milliGRAM(s) IV Push every 4 hours PRN Nausea and/or Vomiting

## 2023-10-21 ENCOUNTER — TRANSCRIPTION ENCOUNTER (OUTPATIENT)
Age: 53
End: 2023-10-21

## 2023-10-21 VITALS
RESPIRATION RATE: 16 BRPM | TEMPERATURE: 98 F | SYSTOLIC BLOOD PRESSURE: 131 MMHG | OXYGEN SATURATION: 98 % | DIASTOLIC BLOOD PRESSURE: 91 MMHG | HEART RATE: 50 BPM

## 2023-10-21 LAB
ANION GAP SERPL CALC-SCNC: 2 MMOL/L — LOW (ref 5–17)
ANION GAP SERPL CALC-SCNC: 2 MMOL/L — LOW (ref 5–17)
BASOPHILS # BLD AUTO: 0.08 K/UL — SIGNIFICANT CHANGE UP (ref 0–0.2)
BASOPHILS # BLD AUTO: 0.08 K/UL — SIGNIFICANT CHANGE UP (ref 0–0.2)
BASOPHILS NFR BLD AUTO: 1 % — SIGNIFICANT CHANGE UP (ref 0–2)
BASOPHILS NFR BLD AUTO: 1 % — SIGNIFICANT CHANGE UP (ref 0–2)
BUN SERPL-MCNC: 8 MG/DL — SIGNIFICANT CHANGE UP (ref 7–23)
BUN SERPL-MCNC: 8 MG/DL — SIGNIFICANT CHANGE UP (ref 7–23)
CALCIUM SERPL-MCNC: 8.9 MG/DL — SIGNIFICANT CHANGE UP (ref 8.5–10.1)
CALCIUM SERPL-MCNC: 8.9 MG/DL — SIGNIFICANT CHANGE UP (ref 8.5–10.1)
CHLORIDE SERPL-SCNC: 110 MMOL/L — HIGH (ref 96–108)
CHLORIDE SERPL-SCNC: 110 MMOL/L — HIGH (ref 96–108)
CO2 SERPL-SCNC: 29 MMOL/L — SIGNIFICANT CHANGE UP (ref 22–31)
CO2 SERPL-SCNC: 29 MMOL/L — SIGNIFICANT CHANGE UP (ref 22–31)
CREAT SERPL-MCNC: 0.88 MG/DL — SIGNIFICANT CHANGE UP (ref 0.5–1.3)
CREAT SERPL-MCNC: 0.88 MG/DL — SIGNIFICANT CHANGE UP (ref 0.5–1.3)
EGFR: 102 ML/MIN/1.73M2 — SIGNIFICANT CHANGE UP
EGFR: 102 ML/MIN/1.73M2 — SIGNIFICANT CHANGE UP
EOSINOPHIL # BLD AUTO: 0.49 K/UL — SIGNIFICANT CHANGE UP (ref 0–0.5)
EOSINOPHIL # BLD AUTO: 0.49 K/UL — SIGNIFICANT CHANGE UP (ref 0–0.5)
EOSINOPHIL NFR BLD AUTO: 6 % — SIGNIFICANT CHANGE UP (ref 0–6)
EOSINOPHIL NFR BLD AUTO: 6 % — SIGNIFICANT CHANGE UP (ref 0–6)
GLUCOSE SERPL-MCNC: 113 MG/DL — HIGH (ref 70–99)
GLUCOSE SERPL-MCNC: 113 MG/DL — HIGH (ref 70–99)
HCT VFR BLD CALC: 40.5 % — SIGNIFICANT CHANGE UP (ref 39–50)
HCT VFR BLD CALC: 40.5 % — SIGNIFICANT CHANGE UP (ref 39–50)
HGB BLD-MCNC: 13.8 G/DL — SIGNIFICANT CHANGE UP (ref 13–17)
HGB BLD-MCNC: 13.8 G/DL — SIGNIFICANT CHANGE UP (ref 13–17)
LYMPHOCYTES # BLD AUTO: 2.12 K/UL — SIGNIFICANT CHANGE UP (ref 1–3.3)
LYMPHOCYTES # BLD AUTO: 2.12 K/UL — SIGNIFICANT CHANGE UP (ref 1–3.3)
LYMPHOCYTES # BLD AUTO: 26 % — SIGNIFICANT CHANGE UP (ref 13–44)
LYMPHOCYTES # BLD AUTO: 26 % — SIGNIFICANT CHANGE UP (ref 13–44)
MCHC RBC-ENTMCNC: 30.4 PG — SIGNIFICANT CHANGE UP (ref 27–34)
MCHC RBC-ENTMCNC: 30.4 PG — SIGNIFICANT CHANGE UP (ref 27–34)
MCHC RBC-ENTMCNC: 34.1 GM/DL — SIGNIFICANT CHANGE UP (ref 32–36)
MCHC RBC-ENTMCNC: 34.1 GM/DL — SIGNIFICANT CHANGE UP (ref 32–36)
MCV RBC AUTO: 89.2 FL — SIGNIFICANT CHANGE UP (ref 80–100)
MCV RBC AUTO: 89.2 FL — SIGNIFICANT CHANGE UP (ref 80–100)
MONOCYTES # BLD AUTO: 0.73 K/UL — SIGNIFICANT CHANGE UP (ref 0–0.9)
MONOCYTES # BLD AUTO: 0.73 K/UL — SIGNIFICANT CHANGE UP (ref 0–0.9)
MONOCYTES NFR BLD AUTO: 9 % — SIGNIFICANT CHANGE UP (ref 2–14)
MONOCYTES NFR BLD AUTO: 9 % — SIGNIFICANT CHANGE UP (ref 2–14)
NEUTROPHILS # BLD AUTO: 4.48 K/UL — SIGNIFICANT CHANGE UP (ref 1.8–7.4)
NEUTROPHILS # BLD AUTO: 4.48 K/UL — SIGNIFICANT CHANGE UP (ref 1.8–7.4)
NEUTROPHILS NFR BLD AUTO: 55 % — SIGNIFICANT CHANGE UP (ref 43–77)
NEUTROPHILS NFR BLD AUTO: 55 % — SIGNIFICANT CHANGE UP (ref 43–77)
NRBC # BLD: SIGNIFICANT CHANGE UP /100 WBCS (ref 0–0)
NRBC # BLD: SIGNIFICANT CHANGE UP /100 WBCS (ref 0–0)
PLATELET # BLD AUTO: 268 K/UL — SIGNIFICANT CHANGE UP (ref 150–400)
PLATELET # BLD AUTO: 268 K/UL — SIGNIFICANT CHANGE UP (ref 150–400)
POTASSIUM SERPL-MCNC: 4.1 MMOL/L — SIGNIFICANT CHANGE UP (ref 3.5–5.3)
POTASSIUM SERPL-MCNC: 4.1 MMOL/L — SIGNIFICANT CHANGE UP (ref 3.5–5.3)
POTASSIUM SERPL-SCNC: 4.1 MMOL/L — SIGNIFICANT CHANGE UP (ref 3.5–5.3)
POTASSIUM SERPL-SCNC: 4.1 MMOL/L — SIGNIFICANT CHANGE UP (ref 3.5–5.3)
RBC # BLD: 4.54 M/UL — SIGNIFICANT CHANGE UP (ref 4.2–5.8)
RBC # BLD: 4.54 M/UL — SIGNIFICANT CHANGE UP (ref 4.2–5.8)
RBC # FLD: 12.5 % — SIGNIFICANT CHANGE UP (ref 10.3–14.5)
RBC # FLD: 12.5 % — SIGNIFICANT CHANGE UP (ref 10.3–14.5)
SODIUM SERPL-SCNC: 141 MMOL/L — SIGNIFICANT CHANGE UP (ref 135–145)
SODIUM SERPL-SCNC: 141 MMOL/L — SIGNIFICANT CHANGE UP (ref 135–145)
WBC # BLD: 8.14 K/UL — SIGNIFICANT CHANGE UP (ref 3.8–10.5)
WBC # BLD: 8.14 K/UL — SIGNIFICANT CHANGE UP (ref 3.8–10.5)
WBC # FLD AUTO: 8.14 K/UL — SIGNIFICANT CHANGE UP (ref 3.8–10.5)
WBC # FLD AUTO: 8.14 K/UL — SIGNIFICANT CHANGE UP (ref 3.8–10.5)

## 2023-10-21 PROCEDURE — 99239 HOSP IP/OBS DSCHRG MGMT >30: CPT

## 2023-10-21 RX ORDER — LACTOBACILLUS ACIDOPHILUS 100MM CELL
1 CAPSULE ORAL
Qty: 24 | Refills: 0
Start: 2023-10-21 | End: 2023-11-01

## 2023-10-21 RX ADMIN — PIPERACILLIN AND TAZOBACTAM 25 GRAM(S): 4; .5 INJECTION, POWDER, LYOPHILIZED, FOR SOLUTION INTRAVENOUS at 06:54

## 2023-10-21 NOTE — DISCHARGE NOTE PROVIDER - NSDCCPCAREPLAN_GEN_ALL_CORE_FT
PRINCIPAL DISCHARGE DIAGNOSIS  Diagnosis: Diverticulitis  Assessment and Plan of Treatment:   #Acute uncomplicated diverticulitis with failure of outpatient treatment - Resolving   Imaging showing uncomplicated diverticulitis  Was on PO cipro/flagyl at home, prescribed on 10/12  - d/c on augmentin 875 TID to complete 10 days  Low fiber diet  -FU with [facility] Dr Kendrick to repeat colonsocopy in 6-8 weeks    Blood and Urine cx NGTD         SECONDARY DISCHARGE DIAGNOSES  Diagnosis: Hematuria  Assessment and Plan of Treatment: #Microscopic hematuria  #Ketonuria  f/u with PMD or Urology   no  sx  no gross hematuria  recommend repeat UA in 4-6 weeks as outpatient  urine cx NGTD   CT scan with no renal stones or hydronephrosis, minimally distended, prostate is enlarged  Recommend urology follow up as outpatient

## 2023-10-21 NOTE — DISCHARGE NOTE NURSING/CASE MANAGEMENT/SOCIAL WORK - NSDCPEFALRISK_GEN_ALL_CORE
For information on Fall & Injury Prevention, visit: [email or web address] OR  [email or web address] OR  https://www.cdc.gov/steadi/patient.html

## 2023-10-21 NOTE — PROGRESS NOTE ADULT - ASSESSMENT
52 yo male admitted for acute uncomplicated diverticulitis, failed outpatient abx    #Intractable abdominal pain  #Acute uncomplicated diverticulitis with failure of outpatient treatment - Resolving   #Nausea  #Leukocytosis - normalized now   Zofran prn n/v  Tylenol prn pain- pt states since receiving zosyn in ED his pain has subsided  Diarrhea improving, <4 BM over last 24 hrs  IVF for gentle hydration in setting of poor po intake due to nausea/abd pain  Imaging showing uncomplicated diverticulitis  Was on PO cipro/flagyl at home, prescribed on 10/12  ID consult appreciated, plan for Augmentin on d/c  GI consult appreciated - d/c on augmentin 875 TID to complete 10 daysLow fiber diet, transition to high fiber outpatientrepeat colonsocopy in 6-8 weeks    SCD for DVT ppx   Regular diet- pt tolerated sandwich in ED  Blood and Urine cx NGTD     #Microscopic hematuria  #Ketonuria  f/u with PMD  no  sx  no gross hematuria  c/w gentle hydration  recommend repeat UA in 4-6 weeks as outpatient  no acute interventions at this time  pt reported no prior hx hematuria   exam of abdomen benign   renal function on BMP normal   urine cx NGTD   CT scan with no renal stones or hydronephrosis, minimally distended, prostate is enlarged  Recommend urology follow up as outpatient     #VTE ppx  low risk  encourage ambulation

## 2023-10-21 NOTE — DISCHARGE NOTE PROVIDER - NSDCMRMEDTOKEN_GEN_ALL_CORE_FT
amoxicillin-clavulanate 875 mg-125 mg oral tablet: 875 milligram(s) orally 3 times a day  lactobacillus acidophilus oral capsule: 1 cap(s) orally 2 times a day

## 2023-10-21 NOTE — DISCHARGE NOTE PROVIDER - CARE PROVIDER_API CALL
Daniel Kendrick  Gastroenterology  [address], [address]  [place] [address]  Phone: [phone]  Fax: [phone]  Follow Up Time:     Oliver Bean  Urology  [address]  [place] [address]  Phone: [phone]  Fax: [phone]  Follow Up Time:

## 2023-10-21 NOTE — DISCHARGE NOTE PROVIDER - HOSPITAL COURSE
HOSPITALIST PROGRESS NOTE:  SUBJECTIVE:  PCP:  Chief Complaint: Patient is a 53y old  Male who presents with a chief complaint of Diverticulitis with failure of outpatient therapy (20 Oct 2023 18:34)      HPI:  Pt is a 52 yo male with a pmh/o diverticulitis, uncomplicated and treated as outpatient about 20 yrs ago, benign colon polyps diagnosed 3 yrs ago, who presents to ED due to intractable generalized, non radiating, cramplike abdominal pain which fluctuated in intensity and is aggrevated by oral intake and associated with diarrhea. Pt states he was diagnosed with diverticulitis as outpatient and placed on cipro/flagyl. States last week he was having 7-8 BM per day, non bloody but watery diarrhea and since starting abx is now down to 2-3 BM a day however states that pain has not gotten better and has actually worsened over time prompting his ED visit.  (19 Oct 2023 21:56)    10/21: Above reviewed. Patient feels better no abd pain or diarrhea. No overnight events. would like to go home     52 yo male admitted for acute uncomplicated diverticulitis, failed outpatient abx    #Intractable abdominal pain  #Acute uncomplicated diverticulitis with failure of outpatient treatment - Resolving   #Nausea  #Leukocytosis - normalized now   Zofran prn n/v  Tylenol prn pain- pt states since receiving zosyn in ED his pain has subsided  Diarrhea improving, <4 BM over last 24 hrs  IVF for gentle hydration in setting of poor po intake due to nausea/abd pain  Imaging showing uncomplicated diverticulitis  Was on PO cipro/flagyl at home, prescribed on 10/12  ID consult appreciated, plan for Augmentin on d/c  GI consult appreciated - d/c on augmentin 875 TID to complete 10 daysLow fiber diet, transition to high fiber outpatientrepeat colonsocopy in 6-8 weeks    SCD for DVT ppx   Regular diet- pt tolerated sandwich in ED  Blood and Urine cx NGTD     #Microscopic hematuria  #Ketonuria  f/u with PMD  no  sx  no gross hematuria  c/w gentle hydration  recommend repeat UA in 4-6 weeks as outpatient  no acute interventions at this time  pt reported no prior hx hematuria   exam of abdomen benign   renal function on BMP normal   urine cx NGTD   CT scan with no renal stones or hydronephrosis, minimally distended, prostate is enlarged  Recommend urology follow up as outpatient     #VTE ppx  low risk  encourage ambulation     total time 80 minutes

## 2023-10-21 NOTE — PROGRESS NOTE ADULT - SUBJECTIVE AND OBJECTIVE BOX
HPI: 54 yo male with a pmh/o diverticulitis, uncomplicated and treated as outpatient about 20 yrs ago, benign colon polyps diagnosed 3 yrs ago, who presented to ED due to intractable generalized, non radiating, cramplike abdominal pain which fluctuated in intensity and is aggrevated by oral intake and associated with diarrhea. Pt states he was diagnosed with diverticulitis as outpatient and placed on cipro/flagyl. States last week he was having 7-8 BM per day, non bloody but watery diarrhea and since starting abx is now down to 2-3 BM a day however states that pain has not gotten better and has actually worsened over time prompting his ED visit.  (19 Oct 2023 21:56)    Interval Hx: Patient seen this AM, reported feeling much better, no further abdominal pain/diarrhea, tolerating orally, continued on zosyn. Discussed with patient that will continue antibiotics, encouraged po hydration, pending GI evaluation. D/w Dr. Kendrick, patient reported feeling better, plan was for d/c with outpatient follow up and continued oral antibiotics. I returned to discuss with patient, he reported feeling weird since eating regular food, when asked specifically if he had N/V/pain he stated no. I discussed that I spoke with GI and the possible plan but if he was still feeling some discomfort with eating, can monitor overnight and see how he does in the morning. Pt became highly annoyed, shouting, asking multiple questions one after the other, stated that he feels that he is being pushed out of the hospital, he was told that he would be here 2-3 days, he is also concerned about hematuria, as this patient continued to yell and I had no opportunity to answer his questions and reassure him, I stated that I would come back later when he was ready to have a better conversation.     CONSTITUTIONAL: No weakness, fevers or chills, no weight loss   EYES/ENT: No visual changes;  No vertigo or throat pain   NECK: No pain or stiffness  RESPIRATORY: No cough, wheezing, hemoptysis; No shortness of breath  CARDIOVASCULAR: No chest pain or palpitations  GASTROINTESTINAL: No abdominal or epigastric pain. No nausea, vomiting, or hematemesis; No diarrhea or constipation. No melena or hematochezia.  GENITOURINARY: No discharge, hematuria  NEUROLOGICAL: No numbness or weakness  All other review of systems is negative unless indicated above.    MEDICATIONS  (STANDING):  influenza   Vaccine 0.5 milliLiter(s) IntraMuscular once  melatonin 5 milliGRAM(s) Oral at bedtime  piperacillin/tazobactam IVPB.. 3.375 Gram(s) IV Intermittent every 8 hours    MEDICATIONS  (PRN):  acetaminophen     Tablet .. 650 milliGRAM(s) Oral every 6 hours PRN Temp greater or equal to 38C (100.4F), Mild Pain (1 - 3), Moderate Pain (4 - 6)  acetaminophen     Tablet .. 650 milliGRAM(s) Oral every 6 hours PRN Mild Pain (1 - 3)  ondansetron Injectable 4 milliGRAM(s) IV Push every 4 hours PRN Nausea and/or Vomiting      Vital Signs Last 24 Hrs  T(C): 36.8 (20 Oct 2023 08:09), Max: 37.1 (19 Oct 2023 19:42)  T(F): 98.2 (20 Oct 2023 08:09), Max: 98.8 (19 Oct 2023 19:42)  HR: 53 (20 Oct 2023 08:09) (53 - 61)  BP: 122/74 (20 Oct 2023 08:09) (122/74 - 144/85)  BP(mean): 100 (19 Oct 2023 20:17) (100 - 100)  RR: 18 (20 Oct 2023 08:09) (17 - 18)  SpO2: 97% (20 Oct 2023 08:09) (97% - 100%)    Parameters below as of 20 Oct 2023 08:09  Patient On (Oxygen Delivery Method): room air      PHYSICAL EXAM:  GENERAL: NAD, lying in bed comfortably  HEAD:  Atraumatic, Normocephalic  EYES: conjunctiva and sclera clear  ENT: Moist mucous membranes  NECK: Supple, No JVD  CHEST/LUNG: Clear to auscultation bilaterally; No rales, rhonchi, wheezing. Unlabored respirations  HEART: Regular rate and rhythm; No murmurs  ABDOMEN: Bowel sounds present; Soft, Nontender, Nondistended.   EXTREMITIES:  2+ Peripheral Pulses, brisk capillary refill. No clubbing, cyanosis, or edema  NERVOUS SYSTEM:  Alert & Oriented X3, speech clear. No deficits   MSK: FROM all 4 extremities, full and equal strength          LABS:                          12.8   7.88  )-----------( 239      ( 20 Oct 2023 06:42 )             38.3     20 Oct 2023 06:42    140    |  110    |  9      ----------------------------<  108    4.1     |  24     |  0.80     Ca    8.5        20 Oct 2023 06:42    TPro  6.6    /  Alb  2.9    /  TBili  0.4    /  DBili  x      /  AST  16     /  ALT  21     /  AlkPhos  47     20 Oct 2023 06:42    LIVER FUNCTIONS - ( 20 Oct 2023 06:42 )  Alb: 2.9 g/dL / Pro: 6.6 gm/dL / ALK PHOS: 47 U/L / ALT: 21 U/L / AST: 16 U/L / GGT: x             CAPILLARY BLOOD GLUCOSE            Urinalysis Basic - ( 20 Oct 2023 06:42 )    Color: x / Appearance: x / SG: x / pH: x  Gluc: 108 mg/dL / Ketone: x  / Bili: x / Urobili: x   Blood: x / Protein: x / Nitrite: x   Leuk Esterase: x / RBC: x / WBC x   Sq Epi: x / Non Sq Epi: x / Bacteria: x        RADIOLOGY:    < from: CT Abdomen and Pelvis w/ IV Cont (10.19.23 @ 17:21) >  Sagittal and coronal reformats were performed.    FINDINGS:  LOWER CHEST: Within normal limits.    LIVER: Within normal limits.  BILE DUCTS: Normal caliber.  GALLBLADDER: Within normal limits.  SPLEEN: Within normal limits.  PANCREAS: Within normal limits.  ADRENALS: Within normal limits.  KIDNEYS/URETERS: No renal stones or hydronephrosis.    BLADDER: Minimally distended.  REPRODUCTIVE ORGANS: Prostate is enlarged.    BOWEL: No bowel obstruction. Appendix is normal. Colonic diverticulosis.   Colonic wall thickening and moderate pericolonic fat stranding   surrounding a sigmoid diverticulum. No drainable fluid collection or free   air.  PERITONEUM: No ascites.  VESSELS: Atherosclerotic changes.  RETROPERITONEUM/LYMPH NODES: No lymphadenopathy.  ABDOMINAL WALL: Within normal limits.  BONES: Degenerative changes.    IMPRESSION:  Acute uncomplicated sigmoid diverticulitis. No drainable fluid collection   or free air to suggest macroperforation.          
HOSPITALIST PROGRESS NOTE:  SUBJECTIVE:  PCP:  Chief Complaint: Patient is a 53y old  Male who presents with a chief complaint of Diverticulitis with failure of outpatient therapy (20 Oct 2023 18:34)      HPI:  Pt is a 54 yo male with a pmh/o diverticulitis, uncomplicated and treated as outpatient about 20 yrs ago, benign colon polyps diagnosed 3 yrs ago, who presents to ED due to intractable generalized, non radiating, cramplike abdominal pain which fluctuated in intensity and is aggrevated by oral intake and associated with diarrhea. Pt states he was diagnosed with diverticulitis as outpatient and placed on cipro/flagyl. States last week he was having 7-8 BM per day, non bloody but watery diarrhea and since starting abx is now down to 2-3 BM a day however states that pain has not gotten better and has actually worsened over time prompting his ED visit.  (19 Oct 2023 21:56)    10/21: Above reviewed. Patient feels better no abd pain or diarrhea. No overnight events. would like to go home     Allergies:  No Known Allergies    REVIEW OF SYSTEMS:  See HPI. All other review of systems is negative unless indicated above.     OBJECTIVE  Physical Exam:  Vital Signs:    Vital Signs Last 24 Hrs  T(C): 36.7 (21 Oct 2023 07:44), Max: 36.8 (20 Oct 2023 23:16)  T(F): 98.1 (21 Oct 2023 07:44), Max: 98.2 (20 Oct 2023 23:16)  HR: 50 (21 Oct 2023 07:44) (50 - 53)  BP: 131/91 (21 Oct 2023 07:44) (111/68 - 131/91)  BP(mean): --  RR: 16 (21 Oct 2023 07:44) (16 - 18)  SpO2: 98% (21 Oct 2023 07:44) (97% - 99%)    Parameters below as of 21 Oct 2023 07:44  Patient On (Oxygen Delivery Method): room air  Constitutional: NAD, awake and alert  Neurological: AAO x 3, no focal deficits  HEENT: PERRLA, EOMI, MMM  Neck: Soft and supple, No LAD, No JVD  Respiratory: Breath sounds are clear bilaterally, No wheezing, rales or rhonchi  Cardiovascular: S1 and S2, regular rate and rhythm; no Murmurs, gallops or rubs  Gastrointestinal: Bowel Sounds present, soft, nontender, nondistended, no guarding, no rebound tenderness  Back: No CVA tenderness   Extremities: No peripheral edema  Vascular: 2+ peripheral pulses  Musculoskeletal: 5/5 strength b/l upper and lower extremities  Skin: No rashes  Breast: Deferred  Rectal: Deferred    MEDICATIONS  (STANDING):  influenza   Vaccine 0.5 milliLiter(s) IntraMuscular once  melatonin 5 milliGRAM(s) Oral at bedtime  piperacillin/tazobactam IVPB.. 3.375 Gram(s) IV Intermittent every 8 hours      LABS: All Labs Reviewed:                        13.8   8.14  )-----------( 268      ( 21 Oct 2023 07:33 )             40.5     10-21    141  |  110<H>  |  8   ----------------------------<  113<H>  4.1   |  29  |  0.88    Ca    8.9      21 Oct 2023 07:33    TPro  6.6  /  Alb  2.9<L>  /  TBili  0.4  /  DBili  x   /  AST  16  /  ALT  21  /  AlkPhos  47  10-20    Urinalysis Basic - ( 21 Oct 2023 07:33 )    Color: x / Appearance: x / SG: x / pH: x  Gluc: 113 mg/dL / Ketone: x  / Bili: x / Urobili: x   Blood: x / Protein: x / Nitrite: x   Leuk Esterase: x / RBC: x / WBC x   Sq Epi: x / Non Sq Epi: x / Bacteria: x      Blood Culture:   10-19 @ 19:21  Organism --  Gram Stain Blood -- Gram Stain --  Specimen Source .Blood None  Culture-Blood --    10-19 @ 16:16  Organism --  Gram Stain Blood -- Gram Stain --  Specimen Source Clean Catch None  Culture-Blood --        RADIOLOGY/EKG:    < from: CT Abdomen and Pelvis w/ IV Cont (10.19.23 @ 17:21) >    IMPRESSION:  Acute uncomplicated sigmoid diverticulitis. No drainable fluid collection   or free air to suggest macroperforation.      < end of copied text >

## 2023-10-21 NOTE — DISCHARGE NOTE NURSING/CASE MANAGEMENT/SOCIAL WORK - PATIENT PORTAL LINK FT
You can access the FollowMyHealth Patient Portal offered by [facility] by registering at the following website: [email or web address]. By joining [employer]’s FollowMyHealth portal, you will also be able to view your health information using other applications (apps) compatible with our system.

## 2023-10-25 LAB
CULTURE RESULTS: SIGNIFICANT CHANGE UP
SPECIMEN SOURCE: SIGNIFICANT CHANGE UP

## 2023-10-26 DIAGNOSIS — K57.32 DIVERTICULITIS OF LARGE INTESTINE WITHOUT PERFORATION OR ABSCESS WITHOUT BLEEDING: ICD-10-CM

## 2023-10-26 DIAGNOSIS — Z86.010 PERSONAL HISTORY OF COLONIC POLYPS: ICD-10-CM

## 2023-10-26 DIAGNOSIS — R31.29 OTHER MICROSCOPIC HEMATURIA: ICD-10-CM

## 2023-10-26 DIAGNOSIS — Z23 ENCOUNTER FOR IMMUNIZATION: ICD-10-CM

## 2023-10-26 DIAGNOSIS — R19.7 DIARRHEA, UNSPECIFIED: ICD-10-CM

## 2023-11-18 ENCOUNTER — EMERGENCY (EMERGENCY)
Facility: HOSPITAL | Age: 53
LOS: 0 days | Discharge: HOME | End: 2023-11-18
Attending: EMERGENCY MEDICINE
Payer: COMMERCIAL

## 2023-11-18 VITALS
HEART RATE: 86 BPM | WEIGHT: 225.97 LBS | HEIGHT: 76 IN | TEMPERATURE: 98 F | RESPIRATION RATE: 16 BRPM | SYSTOLIC BLOOD PRESSURE: 163 MMHG | DIASTOLIC BLOOD PRESSURE: 71 MMHG | OXYGEN SATURATION: 98 %

## 2023-11-18 VITALS
SYSTOLIC BLOOD PRESSURE: 134 MMHG | DIASTOLIC BLOOD PRESSURE: 82 MMHG | TEMPERATURE: 98 F | HEART RATE: 60 BPM | RESPIRATION RATE: 16 BRPM | OXYGEN SATURATION: 98 %

## 2023-11-18 DIAGNOSIS — Z87.81 PERSONAL HISTORY OF (HEALED) TRAUMATIC FRACTURE: Chronic | ICD-10-CM

## 2023-11-18 DIAGNOSIS — M54.32 SCIATICA, LEFT SIDE: ICD-10-CM

## 2023-11-18 DIAGNOSIS — Z98.890 OTHER SPECIFIED POSTPROCEDURAL STATES: Chronic | ICD-10-CM

## 2023-11-18 DIAGNOSIS — M54.50 LOW BACK PAIN, UNSPECIFIED: ICD-10-CM

## 2023-11-18 PROBLEM — K63.5 POLYP OF COLON: Chronic | Status: ACTIVE | Noted: 2023-10-20

## 2023-11-18 PROBLEM — Z87.19 PERSONAL HISTORY OF OTHER DISEASES OF THE DIGESTIVE SYSTEM: Chronic | Status: ACTIVE | Noted: 2023-10-20

## 2023-11-18 PROCEDURE — 99283 EMERGENCY DEPT VISIT LOW MDM: CPT | Mod: 25

## 2023-11-18 PROCEDURE — 99284 EMERGENCY DEPT VISIT MOD MDM: CPT

## 2023-11-18 PROCEDURE — 96372 THER/PROPH/DIAG INJ SC/IM: CPT

## 2023-11-18 RX ORDER — KETOROLAC TROMETHAMINE 30 MG/ML
30 SYRINGE (ML) INJECTION ONCE
Refills: 0 | Status: DISCONTINUED | OUTPATIENT
Start: 2023-11-18 | End: 2023-11-18

## 2023-11-18 RX ORDER — METHOCARBAMOL 500 MG/1
1 TABLET, FILM COATED ORAL
Qty: 10 | Refills: 0
Start: 2023-11-18 | End: 2023-11-22

## 2023-11-18 RX ORDER — LIDOCAINE 4 G/100G
1 CREAM TOPICAL
Qty: 10 | Refills: 0
Start: 2023-11-18 | End: 2023-11-22

## 2023-11-18 RX ADMIN — Medication 30 MILLIGRAM(S): at 10:48

## 2023-11-18 RX ADMIN — Medication 60 MILLIGRAM(S): at 10:48

## 2023-11-18 NOTE — ED ADULT TRIAGE NOTE - CHIEF COMPLAINT QUOTE
pt complaining of left sciatic nerve pain x5 days.  pt has been trying heat and rest without relief.

## 2023-11-18 NOTE — ED ADULT NURSE NOTE - OBJECTIVE STATEMENT
Pt presents to ED c/o left lower back/buttock pain that radiates down the leg. Pt reports taking Advil a few days ago with mild relief. pads and rest without relief. No weakness to lower extremities or urinary/bowel incontinence.

## 2023-11-18 NOTE — ED STATDOCS - PHYSICAL EXAMINATION
Constitutional: NAD AOx3  Eyes: PERRL EOMI  Head: Normocephalic atraumatic  Mouth: MMM  Cardiac: regular rate and rhythm  Resp: Lungs CTAB  GI: Abd s/nd/nt  Neuro: CN2-12 grossly intact, GAMINO x 4  Skin: No visible rashes

## 2023-11-18 NOTE — ED STATDOCS - OBJECTIVE STATEMENT
54 y/o male with PMHx of diverticulitis (seen at  3 weeks ago), benign colon polyps, herniated disc presents to ED c/o left sciatic nerve pain x5 days. Reports he has pain to left lower buttock radiating down leg. States he has had this before intermittently in the past. Has been applying head pads and rest without relief. Denies taking pain medications. No weakness to lower extremities or urinary/bowel incontinence.

## 2023-11-18 NOTE — ED ADULT NURSE NOTE - NSFALLUNIVINTERV_ED_ALL_ED
Bed/Stretcher in lowest position, wheels locked, appropriate side rails in place/Call bell, personal items and telephone in reach/Instruct patient to call for assistance before getting out of bed/chair/stretcher/Non-slip footwear applied when patient is off stretcher/[place] to call system/Physically safe environment - no spills, clutter or unnecessary equipment/Purposeful proactive rounding/Room/bathroom lighting operational, light cord in reach

## 2023-11-18 NOTE — ED STATDOCS - PATIENT PORTAL LINK FT
You can access the FollowMyHealth Patient Portal offered by [facility] by registering at the following website: [email or web address]. By joining [employer]’s FollowMyHealth portal, you will also be able to view your health information using other applications (apps) compatible with our system.

## 2023-11-18 NOTE — ED STATDOCS - NSFOLLOWUPINSTRUCTIONS_ED_ALL_ED_FT
Back Pain    Back pain is very common in adults. The cause of back pain is rarely dangerous and the pain often gets better over time. The cause of your back pain may not be known and may include strain of muscles or ligaments, degeneration of the spinal disks, or arthritis. Occasionally the pain may radiate down your leg(s). Over-the-counter medicines to reduce pain and inflammation are often the most helpful. Stretching and remaining active frequently helps the healing process.     SEEK IMMEDIATE MEDICAL CARE IF YOU HAVE ANY OF THE FOLLOWING SYMPTOMS: bowel or bladder control problems, unusual weakness or numbness in your arms or legs, nausea or vomiting, abdominal pain, fever, dizziness/lightheadedness.      FU with neurologist and pmd   take nsaids and muscle relaxants  return to ED for any worsening of symptoms

## 2023-11-18 NOTE — ED STATDOCS - CLINICAL SUMMARY MEDICAL DECISION MAKING FREE TEXT BOX
pt with L sciatica nerve pain from his butt to his thigh. no trauma, no red flags. pt does not want narcotics. will give Toradol and Prednisone.

## 2023-11-18 NOTE — ED STATDOCS - CARE PROVIDER_API CALL
Ara Figueroa.  Neurology  [address], [address]  [place] [address]  Phone: [phone]  Fax: [phone]  Follow Up Time: Urgent

## 2023-11-18 NOTE — ED STATDOCS - PROGRESS NOTE DETAILS
pt reeval and states his back pain has subsided. pt ambulating in the ED. pt denies any numbness, tingling, saddle anesthesia, urinary or bowel complaints. pt will fu with neurologist and directed and agrees with plan. pt well appearing on dc. -Mercedes Chambers PA-C

## 2024-03-27 ENCOUNTER — APPOINTMENT (OUTPATIENT)
Dept: NEUROLOGY | Facility: CLINIC | Age: 54
End: 2024-03-27